# Patient Record
Sex: FEMALE | URBAN - METROPOLITAN AREA
[De-identification: names, ages, dates, MRNs, and addresses within clinical notes are randomized per-mention and may not be internally consistent; named-entity substitution may affect disease eponyms.]

---

## 2020-11-16 ENCOUNTER — OFFICE VISIT (OUTPATIENT)
Dept: URBAN - METROPOLITAN AREA CLINIC 62 | Facility: CLINIC | Age: 75
End: 2020-11-16
Payer: COMMERCIAL

## 2020-11-16 DIAGNOSIS — Z98.890 OTHER SPECIFIED POSTPROCEDURAL STATES: ICD-10-CM

## 2020-11-16 PROCEDURE — 99204 OFFICE O/P NEW MOD 45 MIN: CPT | Performed by: OPHTHALMOLOGY

## 2020-11-16 ASSESSMENT — VISUAL ACUITY
OD: 20/40
OS: 20/30

## 2020-11-16 ASSESSMENT — INTRAOCULAR PRESSURE
OS: 17
OD: 18

## 2020-11-16 ASSESSMENT — KERATOMETRY
OS: 42.88
OD: 43.13

## 2020-11-16 NOTE — IMPRESSION/PLAN
Impression: Combined forms of age-related cataract, bilateral: H25.813. Plan: Cataract accounts for pt complaints. Pt desires sx. Schedule CE/IOL both eyes, right then left. Risk/Benefits/Alternatives discussed with patient. Rec. mono-focal vs. TORIC only due to Lasik. Consider ORA/LRI. RL2. Target distance. Combination drops. Order a-scan. Rec. intra-ocular moxifloxacin (PCN/cephalosporin allergy) to decrease the risk of endophthalmitis. Pt s/p LASIK. Discussed difficulty calculating IOL due to previous refractive surgery. I do not recommend multi-focal as the near vision may not be clear due to the corneal changes from refractive surgery. Rec. topography for better IOL calculation.

## 2020-12-22 ENCOUNTER — Encounter (OUTPATIENT)
Dept: URBAN - METROPOLITAN AREA SURGERY 39 | Facility: SURGERY | Age: 75
End: 2020-12-22
Payer: COMMERCIAL

## 2020-12-22 ENCOUNTER — TESTING ONLY (OUTPATIENT)
Dept: URBAN - METROPOLITAN AREA CLINIC 62 | Facility: CLINIC | Age: 75
End: 2020-12-22
Payer: COMMERCIAL

## 2020-12-22 DIAGNOSIS — H25.813 COMBINED FORMS OF AGE-RELATED CATARACT, BILATERAL: Primary | ICD-10-CM

## 2020-12-22 ASSESSMENT — PACHYMETRY
OS: 3.46
OD: 3.46
OD: 23.85
OS: 23.96

## 2020-12-30 ENCOUNTER — SURGERY (OUTPATIENT)
Dept: URBAN - METROPOLITAN AREA SURGERY 40 | Facility: SURGERY | Age: 75
End: 2020-12-30
Payer: COMMERCIAL

## 2020-12-30 PROCEDURE — 66984 XCAPSL CTRC RMVL W/O ECP: CPT | Performed by: OPHTHALMOLOGY

## 2020-12-31 ENCOUNTER — POST-OPERATIVE VISIT (OUTPATIENT)
Dept: URBAN - METROPOLITAN AREA CLINIC 62 | Facility: CLINIC | Age: 75
End: 2020-12-31
Payer: COMMERCIAL

## 2020-12-31 DIAGNOSIS — Z48.810 ENCOUNTER FOR SURGICAL AFTERCARE FOLLOWING SURGERY ON A SENSE ORGAN: Primary | ICD-10-CM

## 2020-12-31 PROCEDURE — 99024 POSTOP FOLLOW-UP VISIT: CPT | Performed by: OPTOMETRIST

## 2020-12-31 ASSESSMENT — INTRAOCULAR PRESSURE: OD: 15

## 2020-12-31 NOTE — IMPRESSION/PLAN
Impression: S/P Cataract Extraction by phacoemulsification with IOL placement; ORA/LRI OD - 1 Day. Encounter for surgical aftercare following surgery on a sense organ  Z48.810. Plan: 2nd eye surgery scheduled OS RL2. Combo Drops OD QID. Systane OU QID.

## 2021-01-06 ENCOUNTER — SURGERY (OUTPATIENT)
Dept: URBAN - METROPOLITAN AREA SURGERY 40 | Facility: SURGERY | Age: 76
End: 2021-01-06
Payer: COMMERCIAL

## 2021-01-06 PROCEDURE — 66984 XCAPSL CTRC RMVL W/O ECP: CPT | Performed by: OPHTHALMOLOGY

## 2021-01-07 ENCOUNTER — POST-OPERATIVE VISIT (OUTPATIENT)
Dept: URBAN - METROPOLITAN AREA CLINIC 62 | Facility: CLINIC | Age: 76
End: 2021-01-07
Payer: COMMERCIAL

## 2021-01-07 DIAGNOSIS — Z96.1 PRESENCE OF INTRAOCULAR LENS: Primary | ICD-10-CM

## 2021-01-07 PROCEDURE — 99024 POSTOP FOLLOW-UP VISIT: CPT | Performed by: OPTOMETRIST

## 2021-01-07 ASSESSMENT — INTRAOCULAR PRESSURE
OS: 18
OD: 18

## 2021-01-13 ENCOUNTER — Encounter (OUTPATIENT)
Dept: URBAN - METROPOLITAN AREA SURGERY 39 | Facility: SURGERY | Age: 76
End: 2021-01-13
Payer: COMMERCIAL

## 2021-01-13 ENCOUNTER — POST-OPERATIVE VISIT (OUTPATIENT)
Dept: URBAN - METROPOLITAN AREA CLINIC 62 | Facility: CLINIC | Age: 76
End: 2021-01-13
Payer: COMMERCIAL

## 2021-01-13 PROCEDURE — 99024 POSTOP FOLLOW-UP VISIT: CPT | Performed by: OPTOMETRIST

## 2021-01-13 ASSESSMENT — INTRAOCULAR PRESSURE
OD: 17
OS: 17
OD: 17
OS: 17

## 2021-01-13 NOTE — IMPRESSION/PLAN
Impression: S/P Cataract Extraction by phacoemulsification with IOL placement; ORA/LRI OS - 7 Days. Presence of intraocular lens  Z96.1.  Plan: Combination drops one drop 4x a day

## 2021-02-03 ENCOUNTER — POST-OPERATIVE VISIT (OUTPATIENT)
Dept: URBAN - METROPOLITAN AREA CLINIC 62 | Facility: CLINIC | Age: 76
End: 2021-02-03
Payer: COMMERCIAL

## 2021-02-03 PROCEDURE — 99024 POSTOP FOLLOW-UP VISIT: CPT | Performed by: OPTOMETRIST

## 2021-02-03 ASSESSMENT — INTRAOCULAR PRESSURE
OD: 16
OS: 16

## 2021-02-03 ASSESSMENT — VISUAL ACUITY
OS: 20/20
OD: 20/25

## 2021-02-03 NOTE — IMPRESSION/PLAN
Impression: S/P Cataract Extraction by phacoemulsification with IOL placement; ORA/LRI OS - 28 Days. Presence of intraocular lens  Z96.1.  Plan: RTC 3 month Dilated Exam. Pred/Moxi/Nepaf - 1 gtt OS qd, ends gtts tomorrow

## 2021-05-04 ENCOUNTER — OFFICE VISIT (OUTPATIENT)
Dept: URBAN - METROPOLITAN AREA CLINIC 63 | Facility: CLINIC | Age: 76
End: 2021-05-04
Payer: COMMERCIAL

## 2021-05-04 DIAGNOSIS — H04.123 DRY EYE SYNDROME OF BILATERAL LACRIMAL GLANDS: Primary | ICD-10-CM

## 2021-05-04 DIAGNOSIS — H10.45 OTHER CHRONIC ALLERGIC CONJUNCTIVITIS: ICD-10-CM

## 2021-05-04 PROCEDURE — 92014 COMPRE OPH EXAM EST PT 1/>: CPT | Performed by: OPTOMETRIST

## 2021-05-04 ASSESSMENT — INTRAOCULAR PRESSURE
OD: 13
OS: 12

## 2022-05-05 ENCOUNTER — OFFICE VISIT (OUTPATIENT)
Dept: URBAN - METROPOLITAN AREA CLINIC 63 | Facility: CLINIC | Age: 77
End: 2022-05-05
Payer: COMMERCIAL

## 2022-05-05 DIAGNOSIS — Z96.1 PRESENCE OF PSEUDOPHAKIA: ICD-10-CM

## 2022-05-05 DIAGNOSIS — H10.45 OTHER CHRONIC ALLERGIC CONJUNCTIVITIS: ICD-10-CM

## 2022-05-05 DIAGNOSIS — H04.123 DRY EYE SYNDROME OF BILATERAL LACRIMAL GLANDS: Primary | ICD-10-CM

## 2022-05-05 PROCEDURE — 99214 OFFICE O/P EST MOD 30 MIN: CPT | Performed by: OPTOMETRIST

## 2022-05-05 ASSESSMENT — INTRAOCULAR PRESSURE
OD: 14
OS: 15

## 2022-05-05 ASSESSMENT — KERATOMETRY
OD: 43.13
OS: 42.13

## 2022-05-05 ASSESSMENT — VISUAL ACUITY
OS: 20/25
OD: 20/20

## 2023-05-09 ENCOUNTER — OFFICE VISIT (OUTPATIENT)
Dept: URBAN - METROPOLITAN AREA CLINIC 63 | Facility: CLINIC | Age: 78
End: 2023-05-09
Payer: COMMERCIAL

## 2023-05-09 DIAGNOSIS — Z96.1 PRESENCE OF INTRAOCULAR LENS: ICD-10-CM

## 2023-05-09 DIAGNOSIS — H04.123 DRY EYE SYNDROME OF BILATERAL LACRIMAL GLANDS: Primary | ICD-10-CM

## 2023-05-09 PROCEDURE — 92014 COMPRE OPH EXAM EST PT 1/>: CPT | Performed by: OPTOMETRIST

## 2023-05-09 ASSESSMENT — INTRAOCULAR PRESSURE
OD: 15
OS: 15

## 2023-12-04 NOTE — IMPRESSION/PLAN
Impression: S/P Cataract Extraction by phacoemulsification with IOL placement; ORA/LRI OS - 1 Day. Presence of intraocular lens  Z96.1. Plan: Stable.  Pred-moxi-nepaf OS QID
-Instructions given to patient Per chart review, pt was supposed to be prescribed albuterol inhaler and prednisone taper. Medications are pended.

## 2025-07-01 ENCOUNTER — OFFICE VISIT (OUTPATIENT)
Dept: URBAN - METROPOLITAN AREA CLINIC 63 | Facility: CLINIC | Age: 80
End: 2025-07-01
Payer: COMMERCIAL

## 2025-07-01 DIAGNOSIS — H10.45 OTHER CHRONIC ALLERGIC CONJUNCTIVITIS: ICD-10-CM

## 2025-07-01 DIAGNOSIS — H04.123 DRY EYE SYNDROME OF BILATERAL LACRIMAL GLANDS: Primary | ICD-10-CM

## 2025-07-01 DIAGNOSIS — Z96.1 PRESENCE OF INTRAOCULAR LENS: ICD-10-CM

## 2025-07-01 PROCEDURE — 92014 COMPRE OPH EXAM EST PT 1/>: CPT | Performed by: OPTOMETRIST

## 2025-07-01 ASSESSMENT — INTRAOCULAR PRESSURE
OD: 15
OS: 15

## 2025-07-01 ASSESSMENT — VISUAL ACUITY
OD: 20/25
OS: 20/30

## 2025-07-01 ASSESSMENT — KERATOMETRY
OD: 43.50
OS: 42.63